# Patient Record
Sex: FEMALE | Race: WHITE | URBAN - METROPOLITAN AREA
[De-identification: names, ages, dates, MRNs, and addresses within clinical notes are randomized per-mention and may not be internally consistent; named-entity substitution may affect disease eponyms.]

---

## 2022-03-09 NOTE — IMPRESSION/PLAN
Impression: Primary open-angle glaucoma, mild stage, bilateral: H40.1131. Plan: Patient's intraocular pressure is within acceptable range and examination is unchanged. Continue current treatment. - Continue as previously prescribed Latanoprost 0.005 % eye drops : Instill one drop in both eyes at bedtime
  - Continue as previously prescribed Timolol 0.5% :  Instill one drop in both eyes twice daily
 - Continue as previously prescribed Brimonidine 0.2% : Instill one drop in both eyes twice daily

## 2022-03-09 NOTE — IMPRESSION/PLAN
Impression: Unspecified blepharitis right upper eyelid: H01.001. Plan: Recommend lid hygiene, e.g.,  warm compresses, eyelid cleaning. Patient ok to d/c doxy. She will call if she has any other issues.

## 2022-06-15 ENCOUNTER — OFFICE VISIT (OUTPATIENT)
Dept: URBAN - METROPOLITAN AREA CLINIC 50 | Facility: CLINIC | Age: 82
End: 2022-06-15
Payer: MEDICARE

## 2022-06-15 DIAGNOSIS — H40.1131 PRIMARY OPEN-ANGLE GLAUCOMA, MILD STAGE, BILATERAL: Primary | ICD-10-CM

## 2022-06-15 DIAGNOSIS — H01.001 UNSPECIFIED BLEPHARITIS RIGHT UPPER EYELID: ICD-10-CM

## 2022-06-15 PROCEDURE — 99212 OFFICE O/P EST SF 10 MIN: CPT | Performed by: OPHTHALMOLOGY

## 2022-06-15 ASSESSMENT — INTRAOCULAR PRESSURE
OD: 16
OS: 15

## 2022-06-15 NOTE — IMPRESSION/PLAN
Impression: Primary open-angle glaucoma, mild stage, bilateral: H40.1131. Plan: Patient's intraocular pressure is within acceptable range and examination is unchanged. Continue current treatment. - Continue as previously prescribed Latanoprost 0.005 % eye drops : Instill one drop in both eyes at bedtime
  - Continue as previously prescribed Timolol 0.5% :  Instill one drop in both eyes twice daily
 - Continue as previously prescribed Brimonidine 0.2% : Instill one drop in both eyes twice daily
Impression: Unspecified blepharitis right upper eyelid: H01.001.  Plan:
Impression: Unspecified blepharitis right upper eyelid: H01.001. Plan: Recommend lid hygiene, e.g.,  warm compresses, eyelid cleaning.
no

## 2022-09-20 NOTE — IMPRESSION/PLAN
Impression: Unspecified blepharitis of right lower eyelid: H01.002. Plan: Recommend lid hygiene, e.g.,  warm compresses, eyelid cleaning. Patient was advised to start Doxy 100 mg bid PO.

## 2022-09-20 NOTE — IMPRESSION/PLAN
Impression: Unspecified blepharitis of left lower eyelid: H01.005. Plan: Recommend lid hygiene, e.g.,  warm compresses, eyelid cleaning. Patient was advised to start Doxy 100 mg bid PO.

## 2022-09-20 NOTE — IMPRESSION/PLAN
Impression: Primary open-angle glaucoma, bilateral, mild stage: H40.1131. Plan: Patient's intraocular pressure is not within acceptable range in her left eye and patient was advised to see Dr. Valentino Clarks for further evaluation. . Continue current treatment. - Continue as previously prescribed Latanoprost 0.005 % eye drops : Instill one drop in both eyes at bedtime, Brimonidine and Timolol twice daily in both eyes.

## 2022-09-20 NOTE — IMPRESSION/PLAN
Impression: Keratoconjunctivitis sicca, bilateral: F89.065. Plan: Patient advised that dry eyes may be the cause of symptoms. Advised to use artificial tears as needed.

## 2022-10-14 NOTE — IMPRESSION/PLAN
Impression: Primary open-angle glaucoma, bilateral, mild stage: H40.1131.

 - Continue as previously prescribed Latanoprost 0.005 % eye drops : Instill one drop in both eyes at bedtime, Brimonidine and Timolol twice daily in both eyes. Plan:  Discussed and reviewed diagnosis with patient today, glaucoma progression, intraocular pressure above target, understood by patient. Recommend Selective Laser Trabeculoplasty. Risk and benefits, alternatives, and expectations of the procedure were discussed. SLT may be repeated to reduce eye pressure again, it may provide long-term control of eye pressure and may reduce or eliminate the need to take eye drop medication. Continue taking current eye drop medication, Patient expressed understanding. Patient to start Ketorolac TID starting one day after laser and use for 4 days then stop. RL 2 Patient to schedule SLT in LEFT EYE.

## 2022-11-15 NOTE — IMPRESSION/PLAN
Impression: S/P SLT (Selective Laser Trabeculoplasty) OS - 6 Days. Encounter for surgical aftercare following surgery on a sense organ  Z48.810. Plan: IOP elevated today, usual for patient. Patient started ointment for face acne unsure if it contains steroid. Recheck IOP and make sure patient is not on any steroid next visit. Patient to continue Brim and Timolol BID OU and Latanoprost QHS OU.  

RTC 1 week

## 2022-11-22 NOTE — IMPRESSION/PLAN
Impression: Primary open-angle glaucoma, bilateral, mild stage: H40.1131.

 - Continue as previously prescribed Latanoprost 0.005 % eye drops : Instill one drop in both eyes at bedtime, Brimonidine and Timolol twice daily in both eyes. Plan: Discussed and reviewed diagnosis with patient today, IOP stable in both eyes, will continue to monitor Continue: Latanoprost qhs ou, brimonidine, timolol bid ou

RTC 4 weeks iop check w/Dr Akash Bang

## 2022-12-20 NOTE — IMPRESSION/PLAN
Impression: Blepharitis of eyelid: H01.009. Plan: Blepharitis accounts for the patient's complaints.  eyelid daily hygiene has been suggested.  The patient understands this may not cure the condition and that there may be the need to be on a maintenance program.

## 2022-12-20 NOTE — IMPRESSION/PLAN
Impression: Primary open-angle glaucoma, bilateral, mild stage: H40.1131.

 - Continue as previously prescribed Latanoprost 0.005 % eye drops : Instill one drop in both eyes at bedtime, Brimonidine and Timolol twice daily in both eyes. Plan: Discussed and reviewed diagnosis with patient today, IOP slightly elevated to the left eye, will continue to monitor iop closely for fluctuation, continue current therapy. If iop continues to eleavte will discuss poitential surgical intervention for now continue drops.  


Continue: Latanoprost qhs ou, brimonidine, timolol bid ou

RTC 6-8 weeks iop check

## 2023-02-14 NOTE — IMPRESSION/PLAN
Impression: Primary open-angle glaucoma, bilateral, mild stage: H40.1131.

 - Continue as previously prescribed Latanoprost 0.005 % eye drops : Instill one drop in both eyes at bedtime, Brimonidine and Timolol twice daily in both eyes. Plan: Discussed and reviewed diagnosis with patient today, IOP excellent in both eyes will continue to monitor iop closely for fluctuation, continue current therapy. 

Continue: Latanoprost qhs ou, brimonidine, timolol bid ou

RTC 6-8 weeks iop check

## 2023-04-13 NOTE — IMPRESSION/PLAN
Impression: Primary open-angle glaucoma, bilateral, mild stage: H40.1131. Plan: IOP extremely high to both eyes today, significant glaucoma damage to the left eye, patient already on maximum tolerated drops. Discussed with patient surgical intervention is needed to maintain iop at an acceptable range.  

continue: Latanoprost qhs ou, Brimonidine & Timolol bid ou

## 2023-04-13 NOTE — IMPRESSION/PLAN
Impression: Primary open-angle glaucoma, bilateral, mild stage: H40.1131. Plan: Progressive glaucoma on maximally tolerated medical therapy. Recommend Xen Gel Stent Placement augmented with mitomycin C for further lowering of intraocular pressure, Risks and benefits of the surgery explained. Patient understands that the purpose of the surgery is to lower eye pressure and not to improve vision. May need 5 fluorouracil subconjunctival injections post-operatively. Patient asked to consult with PCP or cardiologist and stop antiplatelet or anticoagulant medications 7 days prior to the surgery date if medically appropriate. Patient is to start Prednisolone QID and Ofloxacin QID starting one day prior to surgery. All potential side effects and benefits of medication discussed. Patient to bring in all drops to PO visits.   

Schedule Xen Gel stent augmented with mitomycin C in left eye then right eye, RL 2

## 2023-05-09 NOTE — IMPRESSION/PLAN
Impression: S/P Shunt: Xen augmented with MItomycin C OS - 1 Day. Encounter for surgical aftercare following surgery on a sense organ  Z48.810. Plan: Eye is doing well, IOP stable, will start Pred Q2hrs OS Oflox QID OS

continue:
Timolol BID OD Brimonidine BID OD Latanoprost QHS OD

precautions discussed in detail RTC 1 week PO

## 2023-05-16 NOTE — IMPRESSION/PLAN
Impression: S/P Shunt: Xen augmented with MItomycin C OS - 8 Days. Encounter for surgical aftercare following surgery on a sense organ  Z48.810. Plan: Eye is doing well, bleb difussed elevated, Begin Pred taper [id number] D/C Moxifloxacin Resume normal activity RTC 2 weeks

## 2023-05-30 NOTE — IMPRESSION/PLAN
Impression: S/P Shunt: Xen augmented with MItomycin C OS - 22 Days. Encounter for surgical aftercare following surgery on a sense organ  Z48.810. Plan: IOP within acceptable range, stitches may be causing FB sensation, no serious issues noted. Limitations lifted. 

continue pred taper 3x2x1
restart: Timolol bid os only

rtc 2 weeks PO

## 2023-06-19 NOTE — IMPRESSION/PLAN
Impression: S/P Shunt: Xen augmented with MItomycin C OS - 42 Days. Encounter for surgical aftercare following surgery on a sense organ  Z48.810. Plan: continue Timolol bid os, latanoprost qhs od & Brimonidine bid od D/C Pred May obtain updated rx if desired

rtc 4 weeks PO

## 2023-07-17 NOTE — IMPRESSION/PLAN
Impression: S/P Shunt: Xen augmented with MItomycin C OS - 70 Days. Encounter for surgical aftercare following surgery on a sense organ  Z48.810. Plan: IOP has remained elevated to the right eye, discussed with patient may need surgical intervention. For now will start new gtts. If no improvements will proceed with surgical intervention. Timolol bid ou, Rocklatan qhs od Brimonidine bid od D/C latanoprost


RTC 1 week PO